# Patient Record
Sex: MALE | Race: WHITE | URBAN - METROPOLITAN AREA
[De-identification: names, ages, dates, MRNs, and addresses within clinical notes are randomized per-mention and may not be internally consistent; named-entity substitution may affect disease eponyms.]

---

## 2024-01-12 NOTE — PROGRESS NOTES
Patient: Madi GIVENS Pla, Jr.                MRN: [id number]       SSN: [id number]  YOB: 1955        AGE: 68 y.o.        SEX: male      PCP: No primary care provider on file.  01/18/24    Chief Complaint   Patient presents with    Knee Pain     right     HISTORY:  Madi GIVENS Pla, Jr. is a 68 y.o. male [facility]. He denies any recent injury.  He feels pain with standing, walking and stair climbing.  He experiences startup pain after sitting. He has received prior cortisone injections, visco supplementation series, and PT with incomplete temporary relief.     [facility] because they no longer have a knee specialist.     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 He served 10 years in the [employer]. He has a service connected disability. He lives with his wife and adult son in [place]. He has 2 adult sons, 1  adult daughter, and 2 grandchildren. His other son is a manager for [employer] and retired [employer]. He is a Metformin controlled diabetic. Per pt his last A1C was 5.4 and his glucose was 111 on 12/22/23. He weighs 244 lbs. He has recently lost some weight and is working towards losing more.   Wt Readings from Last 3 Encounters:   01/18/24 111.6 kg (246 lb)      Body mass index is 37.4 kg/m².    There is no problem list on file for this patient.      Social History     Tobacco Use    Smoking status: Unknown   Vaping Use    Vaping Use: Unknown        Not on File     No current outpatient medications on file.     Current Facility-Administered Medications   Medication Dose Route Frequency    BUPivacaine (MARCAINE) 0.5 % injection 20 mg  4 mL Intra-artICUlar Once    betamethasone acetate-betamethasone sodium phosphate (CELESTONE) injection 3 mg  3 mg

## 2024-01-18 ENCOUNTER — OFFICE VISIT (OUTPATIENT)
Age: 69
End: 2024-01-18

## 2024-01-18 VITALS — HEIGHT: 68 IN | WEIGHT: 246 LBS | TEMPERATURE: 96.2 F | BODY MASS INDEX: 37.28 KG/M2

## 2024-01-18 DIAGNOSIS — M11.20: Primary | ICD-10-CM

## 2024-01-18 DIAGNOSIS — M11.20 PSEUDOGOUT: ICD-10-CM

## 2024-01-18 DIAGNOSIS — M17.11 UNILATERAL PRIMARY OSTEOARTHRITIS, RIGHT KNEE: ICD-10-CM

## 2024-01-18 DIAGNOSIS — G89.29 CHRONIC PAIN OF RIGHT KNEE: ICD-10-CM

## 2024-01-18 DIAGNOSIS — M25.561 CHRONIC PAIN OF RIGHT KNEE: ICD-10-CM

## 2024-01-18 RX ORDER — BETAMETHASONE SODIUM PHOSPHATE AND BETAMETHASONE ACETATE 3; 3 MG/ML; MG/ML
3 INJECTION, SUSPENSION INTRA-ARTICULAR; INTRALESIONAL; INTRAMUSCULAR; SOFT TISSUE ONCE
Status: COMPLETED | OUTPATIENT
Start: 2024-01-18 | End: 2024-01-18

## 2024-01-18 RX ORDER — BUPIVACAINE HYDROCHLORIDE 5 MG/ML
4 INJECTION, SOLUTION PERINEURAL ONCE
Status: COMPLETED | OUTPATIENT
Start: 2024-01-18 | End: 2024-01-18

## 2024-01-18 RX ADMIN — BETAMETHASONE SODIUM PHOSPHATE AND BETAMETHASONE ACETATE 3 MG: 3; 3 INJECTION, SUSPENSION INTRA-ARTICULAR; INTRALESIONAL; INTRAMUSCULAR; SOFT TISSUE at 09:09

## 2024-01-18 RX ADMIN — BUPIVACAINE HYDROCHLORIDE 20 MG: 5 INJECTION, SOLUTION PERINEURAL at 09:09

## 2024-02-14 NOTE — PROGRESS NOTES
Patient: Madi GIVENS Pla, Jr.                MRN: [id number]       SSN: [id number]  YOB: 1955        AGE: 68 y.o.        SEX: male      PCP: No primary care provider on file.  02/15/24    Chief Complaint   Patient presents with    Injections     Rt knee synvisc # 1     HISTORY:  Madi GIVENS Pla, Jr. is a 68 y.o. male who is seen for right knee pain. He presents today for his first injection in the Synvisc visco supplementation series.    He was previously seen for right knee pain. He denies any recent injury.  He feels pain with standing, walking and stair climbing.  He experiences startup pain after sitting. He has received prior cortisone injections, visco supplementation series, and PT with incomplete temporary relief.      He underwent [facility] no [facility] have a knee specialist.     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 He served 10 years in the [employer]. He has a service connected disability. He lives with his wife and adult son in [place]. He has 2 adult sons, 1  adult daughter, and 2 grandchildren. His other son is a manager for [employer] and retired [employer]. He is a Metformin controlled diabetic. Per pt his last A1C was 5.4 and his glucose was 111 on 12/22/23. He weighs 244 lbs. He has recently lost some weight and is working towards losing more.    Wt Readings from Last 3 Encounters:   01/18/24 111.6 kg (246 lb)      There is no height or weight on file to calculate BMI.    There is no problem list on file for this patient.      Social History     Tobacco Use    Smoking status: Unknown   Vaping Use    Vaping Use: Unknown        Not on File     No current outpatient medications on file.     No current facility-administered medications for this visit.        PHYSICAL EXAMINATION:  There were no vitals

## 2024-02-15 ENCOUNTER — OFFICE VISIT (OUTPATIENT)
Age: 69
End: 2024-02-15
Payer: OTHER GOVERNMENT

## 2024-02-15 DIAGNOSIS — M17.11 UNILATERAL PRIMARY OSTEOARTHRITIS, RIGHT KNEE: Primary | ICD-10-CM

## 2024-02-15 PROCEDURE — 20610 DRAIN/INJ JOINT/BURSA W/O US: CPT | Performed by: SPECIALIST

## 2024-02-19 NOTE — PROGRESS NOTES
Patient: Madi GIVENS Pla, Jr.                MRN: [id number]       SSN: [id number]  YOB: 1955        AGE: 68 y.o.        SEX: male  Body mass index is 37.4 kg/m².    PCP: No primary care provider on file.  02/22/24    Chief Complaint   Patient presents with    Knee Pain     right     HISTORY:  Madi GIVENS Pla, Jr. is a 68 y.o. male who is seen for right knee pain. He presents today for his second injection in the Synvisc visco supplementation series.      ICD-10-CM    1. Unilateral primary osteoarthritis, right knee  M17.11 DRAIN/INJECT LARGE JOINT/BURSA     Hylan G-F 20 (HYLAN) injection 16 mg         PROCEDURE:  Mr. Alfaro's right knee injected with 2 cc of Synvisc.     Chart reviewed for the following:   Brian LEO MD, have reviewed the History, Physical and updated the Allergic reactions for Madi GIVENS Pla, Jr.     TIME OUT performed immediately prior to start of procedure:  Brian LEO MD, have performed the following reviews on Madi GIVENS Pla, Jr. prior to the start of the procedure:            * Patient was identified by name and date of birth   * Agreement on procedure being performed was verified  * Risks and Benefits explained to the patient  * Procedure site verified and marked as necessary  * Patient was positioned for comfort  * Consent was obtained     Time: 11:43 AM     Date of procedure: 2/22/2024    Procedure performed by:  Brian Kenney MD    Mr. Alfaro tolerated the procedure well with no complications.    PLAN:  Mr. Alfaro's right knee injected with 2 cc of Synvisc. Mr. Alfaro will follow up in one week to complete his visco supplementation injection series.    Documentation by arvind Tovar, as documented by Brian Kenney MD.

## 2024-02-22 ENCOUNTER — OFFICE VISIT (OUTPATIENT)
Age: 69
End: 2024-02-22
Payer: OTHER GOVERNMENT

## 2024-02-22 VITALS — BODY MASS INDEX: 37.4 KG/M2 | HEIGHT: 68 IN

## 2024-02-22 DIAGNOSIS — M17.11 UNILATERAL PRIMARY OSTEOARTHRITIS, RIGHT KNEE: Primary | ICD-10-CM

## 2024-02-22 PROCEDURE — 20610 DRAIN/INJ JOINT/BURSA W/O US: CPT | Performed by: SPECIALIST

## 2024-02-26 NOTE — PROGRESS NOTES
Patient: Madi GIVENS Pla, Jr.                MRN: [id number]       SSN: [id number]  YOB: 1955        AGE: 68 y.o.        SEX: male  Body mass index is 37.4 kg/m².    PCP: None, None  02/29/24    Chief Complaint   Patient presents with    Knee Pain     right     HISTORY:  Madi GIVENS Pla, Jr. is a 68 y.o. male who is seen for right knee pain. He presents today for his third injection in the Synvisc visco supplementation series.    PROCEDURE:  Mr. Alfaro's right knee injected with 2 cc of Synvisc.     TIME OUT performed immediately prior to start of procedure:  IBrian MD, have performed the following reviews on Madi GIVENS Pla, Jr. prior to the start of the procedure:            * Patient was identified by name and date of birth   * Agreement on procedure being performed was verified  * Risks and Benefits explained to the patient  * Procedure site verified and marked as necessary  * Patient was positioned for comfort  * Consent was obtained     Time: 11:20 AM     Date of procedure: 2/29/2024    Procedure performed by:  Brian Kenney MD    Mr. Alfaro tolerated the procedure well with no complications      ICD-10-CM    1. Unilateral primary osteoarthritis, right knee  M17.11 DRAIN/INJECT LARGE JOINT/BURSA     Hylan G-F 20 (HYLAN) injection 16 mg        PLAN:  Mr. Alfaro's right knee injected with 2 cc of Synvisc. Mr. Alfaro will follow up PRN now that he has completed his visco supplementation injection series.     Documentation by arvind Tovar, as documented by Brian Kenney MD.

## 2024-02-29 ENCOUNTER — OFFICE VISIT (OUTPATIENT)
Age: 69
End: 2024-02-29
Payer: OTHER GOVERNMENT

## 2024-02-29 VITALS — HEIGHT: 68 IN | WEIGHT: 246 LBS | TEMPERATURE: 97.3 F | BODY MASS INDEX: 37.28 KG/M2

## 2024-02-29 DIAGNOSIS — M17.11 UNILATERAL PRIMARY OSTEOARTHRITIS, RIGHT KNEE: Primary | ICD-10-CM

## 2024-02-29 PROCEDURE — 20610 DRAIN/INJ JOINT/BURSA W/O US: CPT | Performed by: SPECIALIST

## 2024-07-15 ENCOUNTER — TELEPHONE (OUTPATIENT)
Facility: HOSPITAL | Age: 69
End: 2024-07-15

## 2024-07-16 ENCOUNTER — HOSPITAL ENCOUNTER (OUTPATIENT)
Facility: HOSPITAL | Age: 69
Setting detail: RECURRING SERIES
Discharge: HOME | End: 2024-07-19
Payer: OTHER GOVERNMENT

## 2024-07-16 PROCEDURE — 97161 PT EVAL LOW COMPLEX 20 MIN: CPT

## 2024-07-16 NOTE — THERAPY EVALUATION
[facility]  [address], [address], [place] [address] Ph:[phone] Fx: [phone]  Plan of Care / Statement of Necessity for Physical Therapy Services     Patient Name: Madi GIVENS Pla, Jr. : 1955   Medical   Diagnosis: Right knee pain [M25.561] Treatment Diagnosis:  M25.561  RIGHT KNEE PAIN     Onset Date: /Chronic     Referral Source: Kinza Suero PA Start of Care (SOC): 2024   Prior Hospitalization: See medical history Provider #: [id number]   Prior Level of Function: Ind and less pain with ADLs and activities of leisure   Comorbidities: Diabetes mellitus and Musculoskeletal disorders      Assessment / key information:    Patient is a 69 y.o. year old male. Primary complaints of Right knee pain [M25.561] that began in  due to  service. Locates pain at anterior knee. Rates pain as 5-10/10 from best to worst. Pain today is rated as 6/10. Describes pain as throbbing, nagging and constant in nature. Endorses occurences of \"locking up\". Elevation, Volatren Cream, Lidocaine Patch makes the pain better. Standing for 6 hours or more, kneeling/crouching makes the pain worse. 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 Patient enjoys taking part in reading, yard work, working on cars, and playing baseball with grandkids. Their primary goals in PT are to \"reduce pain and reduce instances of locking up\" in order to take part in these activities of leisure. Patient recently saw Kinza Suero PA who prescribed OPPT. They are scheduled to return to their provider on 24 with Orthopedic doctor at [facility].    Not post operative    Prior Diagnostic Tests: [] None      [] Lab work      [x] X-rays      [] CT      [] MRI      [] Other:  Results: bone on bone, minimal cartilage remaining.     Medical History:

## 2024-07-16 NOTE — PROGRESS NOTES
PHYSICAL / OCCUPATIONAL THERAPY - DAILY TREATMENT NOTE (updated )  For Eval visit    Patient Name: Madi GIVENS Pla, Jr.    Date: 2024    : 1955  Insurance: Payor: VACCN OPTUM / Plan: VAC[employer] OPTUM / Product Type: *No Product type* /      Patient  verified yes     Visit #   Current / Total 1 15   Time   In / Out 8:20 am  9:00 am   Pain   In / Out 6 6   Subjective Functional Status/Changes: See POC     TREATMENT AREA =  Right knee pain [M25.561]    OBJECTIVE    30 min   Eval - untimed                      Therapeutic Procedures:  Tx Min Billable or 1:1 Min (if diff from Tx Min) Procedure, Rationale, Specifics   5  06325 Therapeutic Activity (timed):  use of dynamic activities replicating functional movements to increase ROM, strength, coordination, balance, and proprioception in order to improve patient's ability to progress to PLOF and address remaining functional goals.  (see flow sheet as applicable)     Details if applicable:     5  36364 Self Care/Home Management (timed):  improve patient knowledge and understanding of pain reducing techniques, positioning, posture/ergonomics, home safety, activity modification, diagnosis/prognosis, and physical therapy expectations, procedures and progression  to improve patient's ability to progress to PLOF and address remaining functional goals.  (see flow sheet as applicable)     Details if applicable:     10  [facility] Totals Reminder: bill using total billable min of TIMED therapeutic procedures (example: do not include dry needle or estim unattended, both untimed codes, in totals to left)  8-22 min = 1 unit; 23-37 min = 2 units; 38-52 min = 3 units; 53-67 min = 4 units; 68-82 min = 5 units   Total Total     [x]  Patient Education billed concurrently with other procedures   [x] Review HEP    [] Progressed/Changed HEP, detail:    [] Other detail:       Objective Information/Functional Measures/Assessment    See POC.    Patient will continue to benefit from skilled PT

## 2024-07-24 ENCOUNTER — HOSPITAL ENCOUNTER (OUTPATIENT)
Facility: HOSPITAL | Age: 69
Setting detail: RECURRING SERIES
Discharge: HOME | End: 2024-07-27
Payer: OTHER GOVERNMENT

## 2024-07-24 PROCEDURE — 97110 THERAPEUTIC EXERCISES: CPT

## 2024-07-24 PROCEDURE — 97112 NEUROMUSCULAR REEDUCATION: CPT

## 2024-07-24 PROCEDURE — 97530 THERAPEUTIC ACTIVITIES: CPT

## 2024-07-24 NOTE — PROGRESS NOTES
PHYSICAL / OCCUPATIONAL THERAPY - DAILY TREATMENT NOTE    Patient Name: Madi GIVENS Pla, Jr.    Date: 2024    :   Insurance: Payor: V[employer] OPTUM / Plan: V[employer] OPTUM / Product Type: *No Product type* /      Patient  verified Yes     Visit #   Current / Total 2 15   Time   In / Out 819  AM   Pain   In / Out 5/10 0/10   Subjective Functional Status/Changes: Pt arrives today w/ R knee x-rays from  and . Pt reports having 10/10 pain in the mornings and sitting to standing. Pain went down after sitting and resting.   Plans on bringing his cane during his vacation since he'll be out and about and wants to make sure he's stable.   Patient denies falls or red flags since last visit.     TREATMENT AREA =  Right knee pain [M25.561]       OBJECTIVE    Modalities Rationale:     decrease pain to improve patient's ability to progress to PLOF and address remaining functional goals.     min [] Estim Unattended, type/location:                                      []  w/ice    []  w/heat    min [] Estim Attended, type/location:                                     []  w/US     []  w/ice    []  w/heat    []  TENS insruct      min []  Mechanical Traction: type/lbs                   []  pro   []  sup   []  int   []  cont    []  before manual    []  after manual    min []  Ultrasound, settings/location:     10 min  unbill [x]  Ice     []  Heat    location/position: SEMI-RECLINE to B knees post-session    min []  Paraffin,  details:     min []  Vasopneumatic Device, press/temp:     min []  Whirlpool / Fluido:    If using vaso (only need to measure limb vaso being performed on)      pre-treatment girth :       post-treatment girth :       measured at (landmark location) :      min []  Other:    Skin assessment post-treatment:   Intact      Therapeutic Procedures:  Tx Min Billable or 1:1 Min (if diff from Tx Min) Procedure, Rationale, Specifics   Total  64 Total   [facility] Totals Reminder: bill using total billable

## 2024-07-25 NOTE — PROGRESS NOTES
Patient: Madi GIVENS Pla, Jr.                MRN: [id number]       SSN: [id number]  YOB: 1955        AGE: 69 y.o.        SEX: male      PCP: None, None  07/29/24    Chief Complaint   Patient presents with    Knee Pain     Right knee     HISTORY:  Madi GIVENS Pla, Jr. is a 69 y.o. male who is seen for increased right knee pain. Patient successfully completed a right knee Synvisc series on 2/29/24 but his pains have returned. He denies any recent injury.  He feels pain with standing, walking and stair climbing.  He experiences startup pain after sitting.     He was seen at [facility] yesterday for pain that starts in his right lower back and radiates down his right leg. [facility].  He was told that he needed orthopedic clearance to start physical therapy.     [facility] because they no longer have a knee specialist.      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 He served 10 years in the [employer]. He has a service connected disability. He lives with his wife and adult son in [place]. He has 2 adult sons, 1  adult daughter, and 2 grandchildren. His other son is a manager for [employer] and retired [employer]. He is a Metformin controlled diabetic. Per pt his last A1C was 5.4 and his glucose was 111 on 12/22/23. He weighs 239 lbs. He has recently lost 30 lbs while taking Monjaro.   Wt Readings from Last 3 Encounters:   07/29/24 108.4 kg (239 lb)   02/29/24 111.6 kg (246 lb)   01/18/24 111.6 kg (246 lb)      Body mass index is 36.34 kg/m².    There is no problem list on file for this patient.      Social History     Tobacco Use    Smoking status: Unknown   Vaping Use    Vaping Use: Unknown        Allergies   Allergen Reactions    Penicillins      Other Reaction(s): rash/itching

## 2024-07-26 ENCOUNTER — HOSPITAL ENCOUNTER (OUTPATIENT)
Facility: HOSPITAL | Age: 69
Setting detail: RECURRING SERIES
Discharge: HOME | End: 2024-07-29
Payer: OTHER GOVERNMENT

## 2024-07-26 PROCEDURE — 97110 THERAPEUTIC EXERCISES: CPT

## 2024-07-26 PROCEDURE — 97140 MANUAL THERAPY 1/> REGIONS: CPT

## 2024-07-26 PROCEDURE — 97530 THERAPEUTIC ACTIVITIES: CPT

## 2024-07-26 NOTE — PROGRESS NOTES
PHYSICAL / OCCUPATIONAL THERAPY - DAILY TREATMENT NOTE    Patient Name: Madi GIVENS Pla, Jr.    Date: 2024    :   Insurance: Payor: V[employer] OPTUM / Plan: VACCN OPTUM / Product Type: *No Product type* /      Patient  verified Yes     Visit #   Current / Total 3 15   Time   In / Out 7:00 AM 7:40 AM   Pain   In / Out 5/10 2/10   Subjective Functional Status/Changes: Pt presents to therapy with bilateral knee compression sleeves. Reports general R knee pain and tightness throughout the R LE.     TREATMENT AREA =  Right knee pain [M25.561]       OBJECTIVE    Modalities Rationale:     decrease pain to improve patient's ability to progress to PLOF and address remaining functional goals.     min [] Estim Unattended, type/location:                                      []  w/ice    []  w/heat    min [] Estim Attended, type/location:                                     []  w/US     []  w/ice    []  w/heat    []  TENS insruct      min []  Mechanical Traction: type/lbs                   []  pro   []  sup   []  int   []  cont    []  before manual    []  after manual    min []  Ultrasound, settings/location:      min  unbill []  Ice     []  Heat    location/position: SEMI-RECLINE to B knees post-session    min []  Paraffin,  details:     min []  Vasopneumatic Device, press/temp:     min []  Whirlpool / Fluido:    If using vaso (only need to measure limb vaso being performed on)      pre-treatment girth :       post-treatment girth :       measured at (landmark location) :      min []  Other:    Skin assessment post-treatment:   Intact      Therapeutic Procedures:  Tx Min Billable or 1:1 Min (if diff from Tx Min) Procedure, Rationale, Specifics   Total  40 Total   [facility] Totals Reminder: bill using total billable min of TIMED therapeutic procedures (example: do not include dry needle or estim unattended, both untimed codes, in totals to left)  8-22 min = 1 unit; 23-37 min = 2 units; 38-52 min = 3 units; 53-67 min = 4

## 2024-07-29 ENCOUNTER — OFFICE VISIT (OUTPATIENT)
Age: 69
End: 2024-07-29
Payer: OTHER GOVERNMENT

## 2024-07-29 VITALS — WEIGHT: 239 LBS | HEIGHT: 68 IN | TEMPERATURE: 97 F | BODY MASS INDEX: 36.22 KG/M2

## 2024-07-29 DIAGNOSIS — G89.29 CHRONIC PAIN OF RIGHT KNEE: ICD-10-CM

## 2024-07-29 DIAGNOSIS — M11.20 PSEUDOGOUT: ICD-10-CM

## 2024-07-29 DIAGNOSIS — M17.11 UNILATERAL PRIMARY OSTEOARTHRITIS, RIGHT KNEE: Primary | ICD-10-CM

## 2024-07-29 DIAGNOSIS — M25.561 CHRONIC PAIN OF RIGHT KNEE: ICD-10-CM

## 2024-07-29 PROCEDURE — 1123F ACP DISCUSS/DSCN MKR DOCD: CPT | Performed by: SPECIALIST

## 2024-07-29 PROCEDURE — 20610 DRAIN/INJ JOINT/BURSA W/O US: CPT | Performed by: SPECIALIST

## 2024-07-29 PROCEDURE — 99213 OFFICE O/P EST LOW 20 MIN: CPT | Performed by: SPECIALIST

## 2024-07-29 RX ORDER — BETAMETHASONE SODIUM PHOSPHATE AND BETAMETHASONE ACETATE 3; 3 MG/ML; MG/ML
3 INJECTION, SUSPENSION INTRA-ARTICULAR; INTRALESIONAL; INTRAMUSCULAR; SOFT TISSUE ONCE
Status: COMPLETED | OUTPATIENT
Start: 2024-07-29 | End: 2024-07-29

## 2024-07-29 RX ORDER — BUPIVACAINE HYDROCHLORIDE 5 MG/ML
4 INJECTION, SOLUTION PERINEURAL ONCE
Status: COMPLETED | OUTPATIENT
Start: 2024-07-29 | End: 2024-07-29

## 2024-07-29 RX ADMIN — BUPIVACAINE HYDROCHLORIDE 20 MG: 5 INJECTION, SOLUTION PERINEURAL at 11:58

## 2024-07-29 RX ADMIN — BETAMETHASONE SODIUM PHOSPHATE AND BETAMETHASONE ACETATE 3 MG: 3; 3 INJECTION, SUSPENSION INTRA-ARTICULAR; INTRALESIONAL; INTRAMUSCULAR; SOFT TISSUE at 11:58

## 2024-07-31 ENCOUNTER — HOSPITAL ENCOUNTER (OUTPATIENT)
Facility: HOSPITAL | Age: 69
Setting detail: RECURRING SERIES
Discharge: HOME | End: 2024-08-03
Payer: OTHER GOVERNMENT

## 2024-07-31 PROCEDURE — 97530 THERAPEUTIC ACTIVITIES: CPT

## 2024-07-31 PROCEDURE — 97140 MANUAL THERAPY 1/> REGIONS: CPT

## 2024-07-31 PROCEDURE — 97112 NEUROMUSCULAR REEDUCATION: CPT

## 2024-07-31 NOTE — PROGRESS NOTES
PHYSICAL / OCCUPATIONAL THERAPY - DAILY TREATMENT NOTE    Patient Name: Madi GIVENS Pla, Jr.    Date: 2024    :   Insurance: Payor: VACCN OPTUM / Plan: VACCN OPTUM / Product Type: *No Product type* /      Patient  verified Yes     Visit #   Current / Total 4 15   Time   In / Out 7:10 AM 7:40 AM   Pain   In / Out 2/10 2/10   Subjective Functional Status/Changes: Got an injection in knee and only helped for 15 minutes. Cortisone.   He also got sciatica and has right back to shin pains right now. They gave him steroids and mm relaxers.      TREATMENT AREA =  Right knee pain [M25.561]     OBJECTIVE    Modalities Rationale:     decrease pain to improve patient's ability to progress to PLOF and address remaining functional goals.     min [] Estim Unattended, type/location:                                      []  w/ice    []  w/heat    min [] Estim Attended, type/location:                                     []  w/US     []  w/ice    []  w/heat    []  TENS insruct      min []  Mechanical Traction: type/lbs                   []  pro   []  sup   []  int   []  cont    []  before manual    []  after manual    min []  Ultrasound, settings/location:      min  unbill []  Ice     []  Heat    location/position: SEMI-RECLINE to B knees post-session    min []  Paraffin,  details:     min []  Vasopneumatic Device, press/temp:     min []  Whirlpool / Fluido:    If using vaso (only need to measure limb vaso being performed on)      pre-treatment girth :       post-treatment girth :       measured at (landmark location) :      min []  Other:    Skin assessment post-treatment:   Intact      Therapeutic Procedures:  Tx Min Billable or 1:1 Min (if diff from Tx Min) Procedure, Rationale, Specifics   Total  30 Total   [facility] Totals Reminder: bill using total billable min of TIMED therapeutic procedures (example: do not include dry needle or estim unattended, both untimed codes, in totals to left)  8-22 min = 1 unit; 23-37 min =

## 2024-08-02 ENCOUNTER — HOSPITAL ENCOUNTER (OUTPATIENT)
Facility: HOSPITAL | Age: 69
Setting detail: RECURRING SERIES
Discharge: HOME | End: 2024-08-05
Payer: OTHER GOVERNMENT

## 2024-08-02 PROCEDURE — 97530 THERAPEUTIC ACTIVITIES: CPT

## 2024-08-02 PROCEDURE — 97535 SELF CARE MNGMENT TRAINING: CPT

## 2024-08-02 PROCEDURE — 97112 NEUROMUSCULAR REEDUCATION: CPT

## 2024-08-02 PROCEDURE — 97140 MANUAL THERAPY 1/> REGIONS: CPT

## 2024-08-02 NOTE — PROGRESS NOTES
travelling.    55  [facility] Totals Reminder: bill using total billable min of TIMED therapeutic procedures (example: do not include dry needle or estim unattended, both untimed codes, in totals to left)  8-22 min = 1 unit; 23-37 min = 2 units; 38-52 min = 3 units; 53-67 min = 4 units; 68-82 min = 5 units   Total Total     [x]  Patient Education billed concurrently with other procedures   [x] Review HEP    [] Progressed/Changed HEP, detail:    [] Other detail:       Objective Information/Functional Measures/Assessment    Verbal or Tactile cues required: for all stretching initiated this session for form, sets/reps/hold times, and for adjustments needed for angle of pull during stretches   Posture/positioning: good and upright; minimal limping noted on R LE due to limited TKE   ROM: unchanged   Palpation: B hamstring mobility poor with R>L restriction     Therex and NMR as per flow sheet.     Patient will continue to benefit from skilled PT / OT services to modify and progress therapeutic interventions, analyze and address functional mobility deficits, analyze and address ROM deficits, analyze and address strength deficits, analyze and address soft tissue restrictions, analyze and cue for proper movement patterns, and analyze and modify for postural abnormalities to address functional deficits and attain remaining goals.    Progress toward goals / Updated goals:  []  See Progress Note/Recertification    Patient tolerated today's treatment well. Progressed/added therex as follows: seated hamstring and piriformis stretching, and standing stair HF and hamstring stretching. Discussed stretching routine daily and while travelling with patient. Patient is agreeable. Continue to progress as tolerated. Patient making good progress towards all goals at this time. Pain levels masked due to concurrent sciatica experienced on R side, ROM slow to improve due to this. Strength slowly improving. All goals remain applicable.

## 2024-08-13 ENCOUNTER — HOSPITAL ENCOUNTER (OUTPATIENT)
Facility: HOSPITAL | Age: 69
Setting detail: RECURRING SERIES
Discharge: HOME | End: 2024-08-16
Payer: OTHER GOVERNMENT

## 2024-08-13 PROCEDURE — 97530 THERAPEUTIC ACTIVITIES: CPT

## 2024-08-13 PROCEDURE — 97110 THERAPEUTIC EXERCISES: CPT

## 2024-08-13 PROCEDURE — 97112 NEUROMUSCULAR REEDUCATION: CPT

## 2024-08-13 NOTE — PROGRESS NOTES
PHYSICAL / OCCUPATIONAL THERAPY - DAILY TREATMENT NOTE (updated )    Patient Name: Madi GIVENS Pla, Jr.    Date: 2024    : 1955  Insurance: Payor: VACCN OPTUM / Plan: VACCN OPTUM / Product Type: *No Product type* /      Patient  verified yes     Visit #   Current / Total 6 15   Time   In / Out 7:00 am 7:40 am   Pain   In / Out 7 6   Subjective Functional Status/Changes: Patient reports feeling very achy and stiff this morning. Notes he just took his meloxicam not too long ago so the effects are not working quite yet.        TREATMENT AREA =  Right knee pain [M25.561]    OBJECTIVE  Therapeutic Procedures:  Tx Min Billable or 1:1 Min (if diff from Tx Min) Procedure, Rationale, Specifics   15  76397 Therapeutic Exercise (timed):  increase ROM, strength, coordination, balance, and proprioception to improve patient's ability to progress to PLOF and address remaining functional goals. (see flow sheet as applicable)     Details if applicable:       15  25629 Neuromuscular Re-Education (timed):  improve balance, coordination, kinesthetic sense, posture, core stability and proprioception to improve patient's ability to develop conscious control of individual muscles and awareness of position of extremities in order to progress to PLOF and address remaining functional goals. (see flow sheet as applicable)     Details if applicable:     10  13398 Therapeutic Activity (timed):  use of dynamic activities replicating functional movements to increase ROM, strength, coordination, balance, and proprioception in order to improve patient's ability to progress to PLOF and address remaining functional goals.  (see flow sheet as applicable)     Details if applicable:     40  [facility] Totals Reminder: bill using total billable min of TIMED therapeutic procedures (example: do not include dry needle or estim unattended, both untimed codes, in totals to left)  8-22 min = 1 unit; 23-37 min = 2 units; 38-52 min = 3 units; 53-67 min

## 2024-08-15 ENCOUNTER — HOSPITAL ENCOUNTER (OUTPATIENT)
Facility: HOSPITAL | Age: 69
Setting detail: RECURRING SERIES
Discharge: HOME | End: 2024-08-18
Payer: OTHER GOVERNMENT

## 2024-08-15 ENCOUNTER — APPOINTMENT (OUTPATIENT)
Facility: HOSPITAL | Age: 69
End: 2024-08-15
Payer: OTHER GOVERNMENT

## 2024-08-15 PROCEDURE — 97110 THERAPEUTIC EXERCISES: CPT

## 2024-08-15 PROCEDURE — 97112 NEUROMUSCULAR REEDUCATION: CPT

## 2024-08-15 PROCEDURE — 97530 THERAPEUTIC ACTIVITIES: CPT

## 2024-08-15 NOTE — THERAPY RECERTIFICATION
[facility]  [address] [facility] [address], [place] [address] - Ph: [phone]  Fx: [phone]  PHYSICAL THERAPY PROGRESS NOTE  Patient Name: Madi GIVENS Pla, Jr. : 1955   Treatment/Medical Diagnosis: Right knee pain [M25.561]   Referral Source: Kinza Suero PA     Date of Initial Visit: 24 Attended Visits: 7 Missed Visits: 0     SUMMARY OF TREATMENT  Pt has been evaluated/assessed and participated in 7 PT sessions involving skilled therapeutic exercise, functional activity training,?neuromuscular facilitation and coordination training, patient education,?manual therapy interventions including STM and stretching, modalities including ice, and instruction on HEP in order to improve upon R knee ROM, strength, decreased pain, and return to PLOF.    SUBJECTIVE: the back is doing well but the knee is mostly the same.    CURRENT STATUS  Pt has made mild progress in PT. Max pain level at 10/10 , average pain level at 7-8/10, least pain level at 2/10. Pt report 20% improvement since beginning therapy. Feels like he can walk a little bit more. Pt notes the knee is about the same. Talked to the doctor and the plan is to possibly get another injection for the R knee. [facility] currently. Skeptical about this providing him relief. Most recent injection was on 24, and provided mild to no relief. Pt expresses desire for surgical intervention since he has tried PT without much relief and multiple trials of injections with the most recent not providing significant relief.   Does note having had some relief for his sciatica on his R side. Was bothering him a lot 2 weeks ago and today notes no R side sciatica sxs. Pt notes having been performing the HEP as prescribed. Pt will probably benefit from PT for the remaining authorized visits addressing his remaining mobility and strength impairments, and then proceeding

## 2024-08-15 NOTE — PROGRESS NOTES
Review HEP    [] Progressed/Changed HEP, detail:    [] Other detail:       Objective Information/Functional Measures/Assessment    See PN    Patient will continue to benefit from skilled PT / OT services to modify and progress therapeutic interventions, analyze and address functional mobility deficits, analyze and address ROM deficits, analyze and address strength deficits, analyze and address soft tissue restrictions, analyze and cue for proper movement patterns, analyze and modify for postural abnormalities, analyze and address imbalance/dizziness, and instruct in home and community integration to address functional deficits and attain remaining goals.    Progress toward goals / Updated goals:  [x]  See Progress Note/Recertification    PLAN  yes Continue plan of care  [x]  Upgrade activities as tolerated  []  Discharge due to :  []  Other:    Next PN/ RC due on or before 9/15/24  Auth due (visit number/ date) 15 V exp 12/23/24    Abran Cody, PT    8/15/2024    1:29 PM    If an interpreting service was utilized for treatment of this patient, the contents of this document represent the material reviewed with the patient via the .     Future Appointments   Date Time Provider Department Center   8/15/2024  1:40 PM Abran Cody, PT [facility]   8/22/2024  7:00 AM Angelica Mclaughlin, PT [facility]   8/27/2024  7:00 AM Angelica Mclaughlin, PT [facility]   8/29/2024  7:00 AM Angelica Mclaughlin, PT [facility]

## 2024-08-22 ENCOUNTER — HOSPITAL ENCOUNTER (OUTPATIENT)
Facility: HOSPITAL | Age: 69
Setting detail: RECURRING SERIES
Discharge: HOME | End: 2024-08-25
Payer: OTHER GOVERNMENT

## 2024-08-22 PROCEDURE — 97530 THERAPEUTIC ACTIVITIES: CPT

## 2024-08-22 PROCEDURE — 97112 NEUROMUSCULAR REEDUCATION: CPT

## 2024-08-22 PROCEDURE — 97110 THERAPEUTIC EXERCISES: CPT

## 2024-08-22 NOTE — PROGRESS NOTES
PHYSICAL / OCCUPATIONAL THERAPY - DAILY TREATMENT NOTE (updated )    Patient Name: Madi GIVENS Pla, Jr.    Date: 2024    : 1955  Insurance: Payor: [employer] / Plan: [employer] OPTUM / Product Type: *No Product type* /      Patient  verified yes     Visit #   Current / Total 8 15   Time   In / Out 7:00 AM 7:50 AM   Pain   In / Out 6 0   Subjective Functional Status/Changes: Patient reports his knee is really bothering him this morning. Notes he is ready to get the knee surgery.         TREATMENT AREA =  Right knee pain [M25.561]    OBJECTIVE    Modalities Rationale:     decrease inflammation and decrease pain to improve patient's ability to progress to PLOF and address remaining functional goals.     min [] Estim Unattended, type/location:                                      []  w/ice    []  w/heat    min [] Estim Attended, type/location:                                     []  w/US     []  w/ice    []  w/heat    []  TENS insruct      min []  Mechanical Traction: type/lbs                   []  pro   []  sup   []  int   []  cont    []  before manual    []  after manual    min []  Ultrasound, settings/location:                                                []  take home patch       []  in clinic   10 min  unbilled [x]  Ice     []  Heat    location/position: Right knee; ant/post; longsitting    min []  Paraffin,  details:     min []  Vasopneumatic Device, press/temp:     min []  Whirlpool / Fluido:    If using vaso (only need to measure limb vaso being performed on)      pre-treatment girth :       post-treatment girth :       measured at (landmark location) :      min []  Other:    Skin assessment post-treatment (if applicable):    [x]  intact    []  redness- no adverse reaction                 []redness - adverse reaction:        Therapeutic Procedures:  Tx Min Billable or 1:1 Min (if diff from Tx Min) Procedure, Rationale, Specifics   15  62079 Therapeutic Exercise (timed):  increase ROM, strength,  deficits, analyze and address soft tissue restrictions, analyze and cue for proper movement patterns, and analyze and modify for postural abnormalities to address functional deficits and attain remaining goals.    Progress toward goals / Updated goals:  []  See Progress Note/Recertification    Patient tolerated today's treatment well. Progressed/added therex as follows: increased resistance on nustep. Discussed HEP and completing remaining authorized PT visits with patient. Patient is agreeable. Continue to progress as tolerated. Patient making fair progress towards all goals at this time. Pain remains the same and is easy to elevate with increased activity and PT exercises. All goals remain applicable.     PLAN  yes Continue plan of care  [x]  Upgrade activities as tolerated  []  Discharge due to :  []  Other:    Next PN/ RC due on or before 9/15/2024  Auth due (visit number/ date) 15 V exp 12/23/2024    Angelica Mclaughlin PT    8/22/2024    6:57 AM    If an interpreting service was utilized for treatment of this patient, the contents of this document represent the material reviewed with the patient via the .     Future Appointments   Date Time Provider Department Center   8/22/2024  7:00 AM Angelica Mclaughlin PT [facility]   8/27/2024  7:00 AM Angelica Mclaughlin PT [facility]   8/29/2024  7:00 AM Angelica Mclaughlin PT [facility]

## 2024-08-27 ENCOUNTER — HOSPITAL ENCOUNTER (OUTPATIENT)
Facility: HOSPITAL | Age: 69
Setting detail: RECURRING SERIES
Discharge: HOME | End: 2024-08-30
Payer: OTHER GOVERNMENT

## 2024-08-27 PROCEDURE — 97112 NEUROMUSCULAR REEDUCATION: CPT

## 2024-08-27 PROCEDURE — 97110 THERAPEUTIC EXERCISES: CPT

## 2024-08-27 PROCEDURE — 97530 THERAPEUTIC ACTIVITIES: CPT

## 2024-08-27 NOTE — PROGRESS NOTES
PHYSICAL / OCCUPATIONAL THERAPY - DAILY TREATMENT NOTE (updated )    Patient Name: Madi GIVENS Pla, Jr.    Date: 2024    : 1955  Insurance: Payor: [employer] / Plan: [employer] OPTUM / Product Type: *No Product type* /      Patient  verified yes     Visit #   Current / Total 9 15   Time   In / Out 7:00 am 7:50 am   Pain   In / Out 8 6   Subjective Functional Status/Changes: Patient reports he only woke up about an hour ago and still feels very stiff and achy.         TREATMENT AREA =  Right knee pain [M25.561]    OBJECTIVE    Modalities Rationale:     decrease inflammation and decrease pain to improve patient's ability to progress to PLOF and address remaining functional goals.     min [] Estim Unattended, type/location:                                      []  w/ice    []  w/heat    min [] Estim Attended, type/location:                                     []  w/US     []  w/ice    []  w/heat    []  TENS insruct      min []  Mechanical Traction: type/lbs                   []  pro   []  sup   []  int   []  cont    []  before manual    []  after manual    min []  Ultrasound, settings/location:                                                []  take home patch       []  in clinic   10 min  unbilled [x]  Ice     []  Heat    location/position: Right knee; ant/post; longsitting    min []  Paraffin,  details:     min []  Vasopneumatic Device, press/temp:     min []  Whirlpool / Fluido:    If using vaso (only need to measure limb vaso being performed on)      pre-treatment girth :       post-treatment girth :       measured at (landmark location) :      min []  Other:    Skin assessment post-treatment (if applicable):    [x]  intact    []  redness- no adverse reaction                 []redness - adverse reaction:        Therapeutic Procedures:  Tx Min Billable or 1:1 Min (if diff from Tx Min) Procedure, Rationale, Specifics   15  [id number] Therapeutic Exercise (timed):  increase ROM, strength, coordination, balance,  and cue for proper movement patterns, and analyze and modify for postural abnormalities to address functional deficits and attain remaining goals.    Progress toward goals / Updated goals:  []  See Progress Note/Recertification    Patient tolerated today's treatment well. Progressed/added therex as follows: no progressions; reduced therex to table exercises and stretching due to increase in pain severity. Discussed HEP and icing at home with patient. Patient is agreeable. Continue to progress as tolerated. Patient making good progress towards all goals at this time. Pain elevated; ROM remains the same, strength improving well. All goals remain applicable.     PLAN  yes Continue plan of care  [x]  Upgrade activities as tolerated  []  Discharge due to :  []  Other:    Next PN/ RC due on or before 9/15/24  Auth due (visit number/ date) 15 V exp 12/23/24    Angelica Mclaughlin PT    8/27/2024    6:50 AM    If an interpreting service was utilized for treatment of this patient, the contents of this document represent the material reviewed with the patient via the .     Future Appointments   Date Time Provider Department Center   8/27/2024  7:00 AM Angelica Mclaughlin, PT [facility]   8/29/2024  7:00 AM Angelica Mclaughlin, PT [facility]

## 2024-08-29 ENCOUNTER — HOSPITAL ENCOUNTER (OUTPATIENT)
Facility: HOSPITAL | Age: 69
Setting detail: RECURRING SERIES
End: 2024-08-29
Payer: OTHER GOVERNMENT

## 2024-08-29 PROCEDURE — 97110 THERAPEUTIC EXERCISES: CPT

## 2024-08-29 PROCEDURE — 97530 THERAPEUTIC ACTIVITIES: CPT

## 2024-08-29 PROCEDURE — 97112 NEUROMUSCULAR REEDUCATION: CPT

## 2024-08-29 NOTE — PROGRESS NOTES
PHYSICAL / OCCUPATIONAL THERAPY - DAILY TREATMENT NOTE (updated )    Patient Name: Madi GIVENS Pla, Jr.    Date: 2024    : 1955  Insurance: Payor: [employer] / Plan: [employer] OPTUM / Product Type: *No Product type* /      Patient  verified yes     Visit #   Current / Total 10 15   Time   In / Out 7:00 am 7:50 am   Pain   In / Out 7 0 (numb from ice)   Subjective Functional Status/Changes: Patient reports more pain in the mornings still. Notes he needs to schedule a follow up with his provider. Wishes to complete more PT to maintain strength and ROM prior to getting a knee replacement.         TREATMENT AREA =  Right knee pain [M25.561]    OBJECTIVE    Modalities Rationale:     decrease inflammation and decrease pain to improve patient's ability to progress to PLOF and address remaining functional goals.     min [] Estim Unattended, type/location:                                      []  w/ice    []  w/heat    min [] Estim Attended, type/location:                                     []  w/US     []  w/ice    []  w/heat    []  TENS insruct      min []  Mechanical Traction: type/lbs                   []  pro   []  sup   []  int   []  cont    []  before manual    []  after manual    min []  Ultrasound, settings/location:                                                []  take home patch       []  in clinic   10 min  unbilled [x]  Ice     []  Heat    location/position: Right knee; ant/post; longsitting    min []  Paraffin,  details:     min []  Vasopneumatic Device, press/temp:     min []  Whirlpool / Fluido:    If using vaso (only need to measure limb vaso being performed on)      pre-treatment girth :       post-treatment girth :       measured at (landmark location) :      min []  Other:    Skin assessment post-treatment (if applicable):    [x]  intact    []  redness- no adverse reaction                 []redness - adverse reaction:        Therapeutic Procedures:  Tx Min Billable or 1:1 Min (if diff from

## 2024-09-10 ENCOUNTER — HOSPITAL ENCOUNTER (OUTPATIENT)
Facility: HOSPITAL | Age: 69
Setting detail: RECURRING SERIES
Discharge: HOME | End: 2024-09-13
Payer: OTHER GOVERNMENT

## 2024-09-10 PROCEDURE — 97110 THERAPEUTIC EXERCISES: CPT

## 2024-09-10 PROCEDURE — 97112 NEUROMUSCULAR REEDUCATION: CPT

## 2024-09-10 PROCEDURE — 97530 THERAPEUTIC ACTIVITIES: CPT

## 2024-09-12 ENCOUNTER — HOSPITAL ENCOUNTER (OUTPATIENT)
Facility: HOSPITAL | Age: 69
Setting detail: RECURRING SERIES
Discharge: HOME | End: 2024-09-15
Payer: OTHER GOVERNMENT

## 2024-09-12 PROCEDURE — 97110 THERAPEUTIC EXERCISES: CPT

## 2024-09-12 PROCEDURE — 97530 THERAPEUTIC ACTIVITIES: CPT

## 2024-09-12 PROCEDURE — 97112 NEUROMUSCULAR REEDUCATION: CPT

## 2024-09-13 ENCOUNTER — OFFICE VISIT (OUTPATIENT)
Age: 69
End: 2024-09-13

## 2024-09-13 VITALS — WEIGHT: 230 LBS | BODY MASS INDEX: 34.86 KG/M2 | HEIGHT: 68 IN

## 2024-09-13 DIAGNOSIS — G89.29 CHRONIC PAIN OF RIGHT KNEE: ICD-10-CM

## 2024-09-13 DIAGNOSIS — M25.561 CHRONIC PAIN OF RIGHT KNEE: ICD-10-CM

## 2024-09-13 DIAGNOSIS — M17.11 UNILATERAL PRIMARY OSTEOARTHRITIS, RIGHT KNEE: Primary | ICD-10-CM

## 2024-09-13 RX ORDER — HYALURONATE SODIUM 10 MG/ML
20 SYRINGE (ML) INTRAARTICULAR ONCE
Status: COMPLETED | OUTPATIENT
Start: 2024-09-13 | End: 2024-09-13

## 2024-09-13 RX ADMIN — Medication 20 MG: at 16:53

## 2024-09-17 ENCOUNTER — APPOINTMENT (OUTPATIENT)
Facility: HOSPITAL | Age: 69
End: 2024-09-17
Payer: OTHER GOVERNMENT

## 2024-09-19 ENCOUNTER — HOSPITAL ENCOUNTER (OUTPATIENT)
Facility: HOSPITAL | Age: 69
Setting detail: RECURRING SERIES
End: 2024-09-19
Payer: OTHER GOVERNMENT

## 2024-09-20 ENCOUNTER — OFFICE VISIT (OUTPATIENT)
Age: 69
End: 2024-09-20

## 2024-09-20 ENCOUNTER — APPOINTMENT (OUTPATIENT)
Facility: HOSPITAL | Age: 69
End: 2024-09-20
Payer: OTHER GOVERNMENT

## 2024-09-20 DIAGNOSIS — M17.11 UNILATERAL PRIMARY OSTEOARTHRITIS, RIGHT KNEE: Primary | ICD-10-CM

## 2024-09-20 RX ORDER — HYALURONATE SODIUM 10 MG/ML
20 SYRINGE (ML) INTRAARTICULAR ONCE
Status: COMPLETED | OUTPATIENT
Start: 2024-09-20 | End: 2024-09-20

## 2024-09-20 RX ADMIN — Medication 20 MG: at 16:00

## 2024-09-26 ENCOUNTER — APPOINTMENT (OUTPATIENT)
Facility: HOSPITAL | Age: 69
End: 2024-09-26
Payer: OTHER GOVERNMENT

## 2024-09-27 ENCOUNTER — HOSPITAL ENCOUNTER (OUTPATIENT)
Facility: HOSPITAL | Age: 69
Setting detail: RECURRING SERIES
End: 2024-09-27
Payer: OTHER GOVERNMENT

## 2024-09-27 ENCOUNTER — TELEPHONE (OUTPATIENT)
Facility: HOSPITAL | Age: 69
End: 2024-09-27

## 2024-09-27 ENCOUNTER — OFFICE VISIT (OUTPATIENT)
Age: 69
End: 2024-09-27

## 2024-09-27 VITALS — WEIGHT: 230 LBS | HEIGHT: 68 IN | BODY MASS INDEX: 34.86 KG/M2 | TEMPERATURE: 97 F

## 2024-09-27 DIAGNOSIS — M17.11 UNILATERAL PRIMARY OSTEOARTHRITIS, RIGHT KNEE: Primary | ICD-10-CM

## 2024-09-27 RX ORDER — HYALURONATE SODIUM 10 MG/ML
20 SYRINGE (ML) INTRAARTICULAR ONCE
Status: COMPLETED | OUTPATIENT
Start: 2024-09-27 | End: 2024-09-27

## 2024-09-27 RX ADMIN — Medication 20 MG: at 15:57

## 2024-10-01 ENCOUNTER — HOSPITAL ENCOUNTER (OUTPATIENT)
Facility: HOSPITAL | Age: 69
Setting detail: RECURRING SERIES
Discharge: HOME | End: 2024-10-04
Payer: OTHER GOVERNMENT

## 2024-10-01 PROCEDURE — 97140 MANUAL THERAPY 1/> REGIONS: CPT

## 2024-10-01 PROCEDURE — 97110 THERAPEUTIC EXERCISES: CPT

## 2024-10-01 PROCEDURE — 97530 THERAPEUTIC ACTIVITIES: CPT

## 2024-10-01 NOTE — PROGRESS NOTES
PHYSICAL / OCCUPATIONAL THERAPY - DAILY TREATMENT NOTE (updated )    Patient Name: Madi GIVENS Pla, Jr.    Date: 10/1/2024    : 1955  Insurance: Payor: [employer] OPT[employer] / Plan: [employer] OPTUM / Product Type: *No Product type* /      Patient  verified yes     Visit #   Current / Total 13 15   Time   In / Out 7:00 AM 7:50  AM   Pain   In / Out 8 0   Subjective Functional Status/Changes: Pt reports surgeon does not want to do surgery due to his age and co morbidities.  Pt plans to see PCP Dec. 18.     TREATMENT AREA =  Right knee pain [M25.561]    OBJECTIVE    Modalities Rationale:     decrease inflammation and decrease pain to improve patient's ability to progress to PLOF and address remaining functional goals.     min [] Estim Unattended, type/location:                                      []  w/ice    []  w/heat    min [] Estim Attended, type/location:                                     []  w/US     []  w/ice    []  w/heat    []  TENS insruct      min []  Mechanical Traction: type/lbs                   []  pro   []  sup   []  int   []  cont    []  before manual    []  after manual    min []  Ultrasound, settings/location:                                                []  take home patch       []  in clinic   10 min  unbilled [x]  Ice     []  Heat    location/position: Right and left knee; ant/post; supine    min []  Paraffin,  details:     min []  Vasopneumatic Device, press/temp:     min []  Whirlpool / Fluido:    If using vaso (only need to measure limb vaso being performed on)      pre-treatment girth :       post-treatment girth :       measured at (landmark location) :      min []  Other:    Skin assessment post-treatment (if applicable):    [x]  intact    []  redness- no adverse reaction                 []redness - adverse reaction:        Therapeutic Procedures:  Tx Min Billable or 1:1 Min (if diff from Tx Min) Procedure, Rationale, Specifics   15  [id number] Therapeutic Exercise (timed):  increase ROM,

## 2024-10-04 ENCOUNTER — HOSPITAL ENCOUNTER (OUTPATIENT)
Facility: HOSPITAL | Age: 69
Setting detail: RECURRING SERIES
Discharge: HOME | End: 2024-10-07
Payer: OTHER GOVERNMENT

## 2024-10-04 PROCEDURE — 97112 NEUROMUSCULAR REEDUCATION: CPT

## 2024-10-04 PROCEDURE — 97530 THERAPEUTIC ACTIVITIES: CPT

## 2024-10-04 PROCEDURE — 97110 THERAPEUTIC EXERCISES: CPT

## 2024-10-04 PROCEDURE — 97535 SELF CARE MNGMENT TRAINING: CPT

## 2024-10-04 NOTE — PROGRESS NOTES
min = 5 units   Total Total     [x]  Patient Education billed concurrently with other procedures   [x] Review HEP    [] Progressed/Changed HEP, detail:    [] Other detail:       Objective Information/Functional Measures/Assessment    Verbal or Tactile cues required: for sled push/pull for neutral spine and posture   Posture/positioning: good and upright; amb antalgic due to knee pain   ROM: improving slowly     Therex and NMR as per flow sheet.     Patient will continue to benefit from skilled PT / OT services to modify and progress therapeutic interventions, analyze and address functional mobility deficits, analyze and address ROM deficits, analyze and address strength deficits, analyze and address soft tissue restrictions, analyze and cue for proper movement patterns, and analyze and modify for postural abnormalities to address functional deficits and attain remaining goals.    Progress toward goals / Updated goals:  []  See Progress Note/Recertification    Patient tolerated today's treatment well. Progressed/added therex as follows: STS staggered at 22 inches; sled push/pull. Discussed HEP and most recent follow up, as well as requested referral from provider for more PT with patient. Patient is agreeable. Continue to progress as tolerated. Patient making good progress towards all goals at this time. Pain levels remain the same (injection did not improve), ROM and strength slowly improving, pain levels mildly hinder imprvement but patient is eager to maintain and gain more strength and mobility despite pain to improve outcomes. All goals remain applicable.     PLAN  yes Continue plan of care  [x]  Upgrade activities as tolerated  []  Discharge due to :  []  Other:    Next PN/ RC due on or before 10/13/2024  Auth due (visit number/ date) 15 V exp 12/23/24    Angelica Mclaughlin PT    10/4/2024    7:06 AM    If an interpreting service was utilized for treatment of this patient, the contents of this document represent the

## 2024-10-08 ENCOUNTER — HOSPITAL ENCOUNTER (OUTPATIENT)
Facility: HOSPITAL | Age: 69
Setting detail: RECURRING SERIES
Discharge: HOME | End: 2024-10-11
Payer: OTHER GOVERNMENT

## 2024-10-08 PROCEDURE — 97112 NEUROMUSCULAR REEDUCATION: CPT

## 2024-10-08 PROCEDURE — 97535 SELF CARE MNGMENT TRAINING: CPT

## 2024-10-08 PROCEDURE — 97530 THERAPEUTIC ACTIVITIES: CPT

## 2024-10-08 PROCEDURE — 97110 THERAPEUTIC EXERCISES: CPT

## 2024-10-08 NOTE — PROGRESS NOTES
PHYSICAL / OCCUPATIONAL THERAPY - DAILY TREATMENT NOTE (updated )    Patient Name: Madi GIVENS Pla, Jr.    Date: 10/8/2024    : 1955  Insurance: Payor: [employer] OPTUM / Plan: VAC[employer] OPTUM / Product Type: *No Product type* /      Patient  verified yes     Visit #   Current / Total 15 15   Time   In / Out 7:00 am 8:10 am   Pain   In / Out 8 2   Subjective Functional Status/Changes: See PN        TREATMENT AREA =  Right knee pain [M25.561]    OBJECTIVE    Modalities Rationale:     decrease inflammation and decrease pain to improve patient's ability to progress to PLOF and address remaining functional goals.     min [] Estim Unattended, type/location:                                      []  w/ice    []  w/heat    min [] Estim Attended, type/location:                                     []  w/US     []  w/ice    []  w/heat    []  TENS insruct      min []  Mechanical Traction: type/lbs                   []  pro   []  sup   []  int   []  cont    []  before manual    []  after manual    min []  Ultrasound, settings/location:                                                []  take home patch       []  in clinic   10 min  unbilled [x]  Ice     []  Heat    location/position: B knees; longsitting    min []  Paraffin,  details:     min []  Vasopneumatic Device, press/temp:     min []  Whirlpool / Fluido:    If using vaso (only need to measure limb vaso being performed on)      pre-treatment girth :       post-treatment girth :       measured at (landmark location) :      min []  Other:    Skin assessment post-treatment (if applicable):    [x]  intact    []  redness- no adverse reaction                 []redness - adverse reaction:        Therapeutic Procedures:  Tx Min Billable or 1:1 Min (if diff from Tx Min) Procedure, Rationale, Specifics   15  [id number] Therapeutic Exercise (timed):  increase ROM, strength, coordination, balance, and proprioception to improve patient's ability to progress to PLOF and address remaining

## 2024-10-08 NOTE — THERAPY RECERTIFICATION
[facility]SHAWN [facility]  [address] [address] - Ph: [phone]  Fx: [phone]  PHYSICAL THERAPY PROGRESS NOTE  Patient Name: Madi GIVENS Pla, Jr. : 1955   Treatment/Medical Diagnosis: Right knee pain [M25.561]   Referral Source: Kinza Suero PA     Date of Initial Visit: 2024 Attended Visits: 15 Missed Visits: 1 CXL     SUMMARY OF TREATMENT  Pt has made mild progress in PT. Max pain level at 8/10 , average pain level at 6-8/10, least pain level at 5/10. Pt report 30% improvement since beginning therapy. Feels like he can walk a little bit more. Pt notes the knee is about the same. Most recent injection was in September, and provided no relief. Pt expresses desire for surgical intervention since he has tried PT without much relief and multiple trials of injections with the most recent not providing significant relief, however provider he recently saw denied him for surgery due to other comorbidities. Pt expressed frustrations due to being cleared for surgery right before the COVID-19 pandemic occurred and he was unable to proceed due to the shut down of elective surgeries. He also notes he has been performing the HEP as prescribed. Pt will benefit from PT for 8 more weeks addressing his remaining mobility and strength impairments and for a \"prehab\" program pending potential future surgical intervention due to levels of pain being so high.       Next MD Follow Up: 2024 at [facility]     CURRENT STATUS  Patient Perceived Improvement: 30%  Patient reported goals to attain prior to D/C: improve ROM and strength prior to surgical intervention  Functional Deficits: walking, standing, sitting for too long, going to get up after sitting  Functional Improvements: endurance and ROM improving slowly  Pain Levels: Best to worst in last week 5-8/10; Today 810                Medication? Celebrex, Tylenol PRN,

## 2024-10-31 ENCOUNTER — APPOINTMENT (OUTPATIENT)
Facility: HOSPITAL | Age: 69
End: 2024-10-31
Payer: OTHER GOVERNMENT

## 2024-10-31 ENCOUNTER — HOSPITAL ENCOUNTER (OUTPATIENT)
Facility: HOSPITAL | Age: 69
Setting detail: RECURRING SERIES
End: 2024-10-31
Payer: OTHER GOVERNMENT

## 2024-11-01 ENCOUNTER — HOSPITAL ENCOUNTER (OUTPATIENT)
Facility: HOSPITAL | Age: 69
Setting detail: RECURRING SERIES
Discharge: HOME | End: 2024-11-04
Payer: OTHER GOVERNMENT

## 2024-11-01 PROCEDURE — 97530 THERAPEUTIC ACTIVITIES: CPT

## 2024-11-01 PROCEDURE — 97112 NEUROMUSCULAR REEDUCATION: CPT

## 2024-11-01 PROCEDURE — 97140 MANUAL THERAPY 1/> REGIONS: CPT

## 2024-11-01 PROCEDURE — 97110 THERAPEUTIC EXERCISES: CPT

## 2024-11-01 NOTE — PROGRESS NOTES
Education billed concurrently with other procedures   [x] Review HEP    [] Progressed/Changed HEP, detail:    [] Other detail:       Objective Information/Functional Measures/Assessment    Pt has a plan going forward to possibly get approval for a TKA through his new PCP  Plan to cont PT as we were approved 15 more visits.   Taped the knee today which did provide some pain relief with dependant activity. Did well with [facility] exercises today without pain but has some cramping in the R HS with HS curl today. Ended with [facility] for HS due to cramping and pt left feeling better.     Patient will continue to benefit from skilled PT / OT services to modify and progress therapeutic interventions, analyze and address functional mobility deficits, analyze and address ROM deficits, analyze and address strength deficits, analyze and address soft tissue restrictions, analyze and cue for proper movement patterns, and analyze and modify for postural abnormalities to address functional deficits and attain remaining goals.    Progress toward goals / Updated goals:  []  See Progress Note/Recertification    Will reassess response to taping NV to determine of appropriate for future use for pain relief.     PLAN  yes Continue plan of care  [x]  Upgrade activities as tolerated  []  Discharge due to :  []  Other:    Next PN/ RC due on or before 11/8/2024  Auth due (visit number/ date) 15 V (30 V total) 2/20/24    Abran Cody PT    11/1/2024    12:30 PM    If an interpreting service was utilized for treatment of this patient, the contents of this document represent the material reviewed with the patient via the .     Future Appointments   Date Time Provider Department Center   11/5/2024 10:20 AM Abran Cody PT [facility]   11/7/2024  7:40 AM Abran Cody PT [facility]   11/13/2024 11:40 AM Abran Cody PT [facility]   11/14/2024  7:00 AM Angelica Mclaughlin PT [facility]

## 2024-11-05 ENCOUNTER — HOSPITAL ENCOUNTER (OUTPATIENT)
Facility: HOSPITAL | Age: 69
Setting detail: RECURRING SERIES
Discharge: HOME | End: 2024-11-08
Payer: OTHER GOVERNMENT

## 2024-11-05 PROCEDURE — 97112 NEUROMUSCULAR REEDUCATION: CPT

## 2024-11-05 PROCEDURE — 97530 THERAPEUTIC ACTIVITIES: CPT

## 2024-11-05 PROCEDURE — 97110 THERAPEUTIC EXERCISES: CPT

## 2024-11-05 NOTE — PROGRESS NOTES
PHYSICAL / OCCUPATIONAL THERAPY - DAILY TREATMENT NOTE (updated )    Patient Name: Madi GIVENS Pla, Jr.    Date: 2024    : 1955  Insurance: Payor: [employer] OPTUM / Plan: [employer] OPTUM / Product Type: *No Product type* /      Patient  verified yes     Visit #   Current / Total 17 30   Time   In / Out 10:22 11:05   Pain   In / Out 5/10  0/10   Subjective Functional Status/Changes: Took the tape off the knee this morning. Faxed the xray results to the clinic.      TREATMENT AREA =  Right knee pain [M25.561]    OBJECTIVE    Modalities Rationale:     decrease inflammation and decrease pain to improve patient's ability to progress to PLOF and address remaining functional goals.     min [] Estim Unattended, type/location:                                      []  w/ice    []  w/heat    min [] Estim Attended, type/location:                                     []  w/US     []  w/ice    []  w/heat    []  TENS insruct      min []  Mechanical Traction: type/lbs                   []  pro   []  sup   []  int   []  cont    []  before manual    []  after manual    min []  Ultrasound, settings/location:                                                []  take home patch       []  in clinic   10 min  unbilled [x]  Ice     []  Heat    location/position: Supine B LE over wedge: R knee    min []  Paraffin,  details:     min []  Vasopneumatic Device, press/temp:     min []  Whirlpool / Fluido:    If using vaso (only need to measure limb vaso being performed on)      pre-treatment girth :       post-treatment girth :       measured at (landmark location) :      min []  Other:    Skin assessment post-treatment (if applicable):    [x]  intact    []  redness- no adverse reaction                 []redness - adverse reaction:        Therapeutic Procedures:  Tx Min Billable or 1:1 Min (if diff from Tx Min) Procedure, Rationale, Specifics   13  63347 Therapeutic Exercise (timed):  increase ROM, strength, coordination, balance, and

## 2024-11-07 ENCOUNTER — HOSPITAL ENCOUNTER (OUTPATIENT)
Facility: HOSPITAL | Age: 69
Setting detail: RECURRING SERIES
Discharge: HOME | End: 2024-11-10
Payer: OTHER GOVERNMENT

## 2024-11-07 PROCEDURE — 97140 MANUAL THERAPY 1/> REGIONS: CPT

## 2024-11-07 PROCEDURE — 97530 THERAPEUTIC ACTIVITIES: CPT

## 2024-11-07 PROCEDURE — 97110 THERAPEUTIC EXERCISES: CPT

## 2024-11-07 PROCEDURE — 97112 NEUROMUSCULAR REEDUCATION: CPT

## 2024-11-07 NOTE — PROGRESS NOTES
PHYSICAL / OCCUPATIONAL THERAPY - DAILY TREATMENT NOTE (updated )    Patient Name: Madi GIVENS Pla, Jr.    Date: 2024    : 1955  Insurance: Payor: [employer] OPTUM / Plan: [employer] OPTUM / Product Type: *No Product type* /      Patient  verified yes     Visit #   Current / Total 18 30   Time   In / Out 7:30 8:33   Pain   In / Out 5/10 2/10   Subjective Functional Status/Changes: See PN     TREATMENT AREA =  Right knee pain [M25.561]    OBJECTIVE    Modalities Rationale:     decrease inflammation and decrease pain to improve patient's ability to progress to PLOF and address remaining functional goals.     min [] Estim Unattended, type/location:                                      []  w/ice    []  w/heat    min [] Estim Attended, type/location:                                     []  w/US     []  w/ice    []  w/heat    []  TENS insruct      min []  Mechanical Traction: type/lbs                   []  pro   []  sup   []  int   []  cont    []  before manual    []  after manual    min []  Ultrasound, settings/location:                                                []  take home patch       []  in clinic   10 min  unbilled [x]  Ice     []  Heat    location/position: Supine B LE over wedge: R knee    min []  Paraffin,  details:     min []  Vasopneumatic Device, press/temp:     min []  Whirlpool / Fluido:    If using vaso (only need to measure limb vaso being performed on)      pre-treatment girth :       post-treatment girth :       measured at (landmark location) :      min []  Other:    Skin assessment post-treatment (if applicable):    [x]  intact    []  redness- no adverse reaction                 []redness - adverse reaction:        Therapeutic Procedures:  Tx Min Billable or 1:1 Min (if diff from Tx Min) Procedure, Rationale, Specifics   25  06874 Therapeutic Exercise (timed):  increase ROM, strength, coordination, balance, and proprioception to improve patient's ability to progress to PLOF and address

## 2024-11-07 NOTE — THERAPY RECERTIFICATION
[facility]  [address],[place] [address] - Ph: [phone]  Fx: [phone]  PHYSICAL THERAPY PROGRESS NOTE  Patient Name: Madi GIVENS Pla, Jr. : 1955   Treatment/Medical Diagnosis: Right knee pain [M25.561]   Referral Source: Kinza Suero PA     Date of Initial Visit:  Attended Visits: 18 Missed Visits: 1 NS  1 CXL     SUMMARY OF TREATMENT  Pt has been evaluated/assessed and participated in 18 PT sessions involving skilled therapeutic exercise, functional activity training,?neuromuscular facilitation and coordination training, patient education,?manual therapy interventions, modalities including ice, and instruction on HEP in order to improve upon R knee ROM, strength, decreased pain, and return to PLOF.    SUBJECTIVE: been working all night so the knee is feeling it. Already has over 8,000 steps today.    CURRENT STATUS  Pt has only been seen 3x since his last PN due to gap in receiving authorization for PT visits.   Pt has made minimal progress in PT. Max pain level still at 8/10, average pain level at 5/10, least pain level at 5/10. Pt report still being about 30% improvement since beginning therapy which is no improvement since last PN done on 10/8/24. Pt notes the knee is about the same. Most recent viscosupplementation injection was in September, and provided no long term relief. No other injections for the R knee since this. Currently reports having changed PCPs to Dr. Andrew but still under the same practice with the same PA. Had repeat Xray's on 10/25/24. [facility] and [facility] to have an MRI which is not scheduled yet. Will be going back to see his provider on 24.   Pt expresses desire for surgical intervention since he has tried PT since 24 without much relief and multiple trials of injections with the most recent not providing significant relief, however

## 2024-11-13 ENCOUNTER — HOSPITAL ENCOUNTER (OUTPATIENT)
Facility: HOSPITAL | Age: 69
Setting detail: RECURRING SERIES
Discharge: HOME | End: 2024-11-16
Payer: OTHER GOVERNMENT

## 2024-11-13 PROCEDURE — 97110 THERAPEUTIC EXERCISES: CPT

## 2024-11-13 PROCEDURE — 97112 NEUROMUSCULAR REEDUCATION: CPT

## 2024-11-13 PROCEDURE — 97140 MANUAL THERAPY 1/> REGIONS: CPT

## 2024-11-13 NOTE — PROGRESS NOTES
Measures/Assessment    Pt being see for back to back days of PT. Therefore today's session was spent more address soft tissue mobility, flexibility, and balance training. Incr TTP along medial joint line with hypomotility with tibial IR. Pt shows incr ankle strategy with SLS with heavier eversion moments to compensate. Pt also shown various alterations to challenge SLS today.     Will proceed with strength training during tomorrow's session.     Patient will continue to benefit from skilled PT / OT services to modify and progress therapeutic interventions, analyze and address functional mobility deficits, analyze and address ROM deficits, analyze and address strength deficits, analyze and address soft tissue restrictions, analyze and cue for proper movement patterns, and analyze and modify for postural abnormalities to address functional deficits and attain remaining goals.    Progress toward goals / Updated goals:  []  See Progress Note/Recertification    Made some progress in terms of SLS stability today, showing various challenges for progressing balance.     PLAN  yes Continue plan of care  [x]  Upgrade activities as tolerated  []  Discharge due to :  []  Other:    Next PN/ RC due on or before 12/7/24  Auth due (visit number/ date) 15 V (30 V total) 2/20/24    Abran Cody, PT    11/13/2024    9:13 AM    If an interpreting service was utilized for treatment of this patient, the contents of this document represent the material reviewed with the patient via the .     Future Appointments   Date Time Provider Department Center   11/13/2024 11:40 AM Abran Cody, PT [facility]   11/14/2024  7:00 AM Angelica Mclaughlin, PT [facility]   11/19/2024 10:20 AM Abran Cody, PT [facility]   11/22/2024  7:40 AM Carlos García, PT [facility]   11/26/2024  7:40 AM Daniel Alonso, PT [facility]   11/27/2024 10:20 AM Abran Cody, PT [facility]   12/3/2024  7:00 AM Angelica Mclaughlin, PT [facility]   12/6/2024

## 2024-11-14 ENCOUNTER — HOSPITAL ENCOUNTER (OUTPATIENT)
Facility: HOSPITAL | Age: 69
Setting detail: RECURRING SERIES
Discharge: HOME | End: 2024-11-17
Payer: OTHER GOVERNMENT

## 2024-11-14 PROCEDURE — 97530 THERAPEUTIC ACTIVITIES: CPT

## 2024-11-14 PROCEDURE — 97112 NEUROMUSCULAR REEDUCATION: CPT

## 2024-11-14 PROCEDURE — 97110 THERAPEUTIC EXERCISES: CPT

## 2024-11-14 NOTE — PROGRESS NOTES
PHYSICAL / OCCUPATIONAL THERAPY - DAILY TREATMENT NOTE (updated )    Patient Name: Madi GIVENS Pla, Jr.    Date: 2024    : 1955  Insurance: Payor: [employer] OPTUM / Plan: [employer] OPTUM / Product Type: *No Product type* /      Patient  verified yes     Visit #   Current / Total 20 30   Time   In / Out 700 am 750 am   Pain   In / Out 6 5   Subjective Functional Status/Changes: Patient brings in his xray reports from his most recent study done in 2024. Pain remains the same.         TREATMENT AREA =  Right knee pain [M25.561]    OBJECTIVE    Modalities Rationale:     decrease inflammation and decrease pain to improve patient's ability to progress to PLOF and address remaining functional goals.     min [] Estim Unattended, type/location:                                      []  w/ice    []  w/heat    min [] Estim Attended, type/location:                                     []  w/US     []  w/ice    []  w/heat    []  TENS insruct      min []  Mechanical Traction: type/lbs                   []  pro   []  sup   []  int   []  cont    []  before manual    []  after manual    min []  Ultrasound, settings/location:                                                []  take home patch       []  in clinic   10 min  unbilled [x]  Ice     []  Heat    location/position: Rigth knee; ant/post; longsitting    min []  Paraffin,  details:     min []  Vasopneumatic Device, press/temp:     min []  Whirlpool / Fluido:    If using vaso (only need to measure limb vaso being performed on)      pre-treatment girth :       post-treatment girth :       measured at (landmark location) :      min []  Other:    Skin assessment post-treatment (if applicable):    [x]  intact    []  redness- no adverse reaction                 []redness - adverse reaction:        Therapeutic Procedures:  Tx Min Billable or 1:1 Min (if diff from Tx Min) Procedure, Rationale, Specifics   15  69657 Therapeutic Exercise (timed):  increase ROM, strength,

## 2024-11-19 ENCOUNTER — HOSPITAL ENCOUNTER (OUTPATIENT)
Facility: HOSPITAL | Age: 69
Setting detail: RECURRING SERIES
Discharge: HOME | End: 2024-11-22
Payer: OTHER GOVERNMENT

## 2024-11-19 PROCEDURE — 97530 THERAPEUTIC ACTIVITIES: CPT

## 2024-11-19 PROCEDURE — 97110 THERAPEUTIC EXERCISES: CPT

## 2024-11-19 PROCEDURE — 97112 NEUROMUSCULAR REEDUCATION: CPT

## 2024-11-19 PROCEDURE — 97140 MANUAL THERAPY 1/> REGIONS: CPT

## 2024-11-19 NOTE — PROGRESS NOTES
PHYSICAL / OCCUPATIONAL THERAPY - DAILY TREATMENT NOTE (updated )    Patient Name: Madi GIVENS Pla, Jr.    Date: 2024    : 1955  Insurance: Payor: [employer] OPTUM / Plan: [employer] OPTUM / Product Type: *No Product type* /      Patient  verified yes     Visit #   Current / Total 21 30   Time   In / Out 10:10 10:51   Pain   In / Out 3/10 4/10   Subjective Functional Status/Changes: The knees are feeling it. Both of them but the left is just a bother.      TREATMENT AREA =  Right knee pain [M25.561]    OBJECTIVE    Modalities Rationale:     decrease inflammation and decrease pain to improve patient's ability to progress to PLOF and address remaining functional goals.     min [] Estim Unattended, type/location:                                      []  w/ice    []  w/heat    min [] Estim Attended, type/location:                                     []  w/US     []  w/ice    []  w/heat    []  TENS insruct      min []  Mechanical Traction: type/lbs                   []  pro   []  sup   []  int   []  cont    []  before manual    []  after manual    min []  Ultrasound, settings/location:                                                []  take home patch       []  in clinic   ND min  unbilled [x]  Ice     []  Heat    location/position: Rigth knee; ant/post; longsitting    min []  Paraffin,  details:     min []  Vasopneumatic Device, press/temp:     min []  Whirlpool / Fluido:    If using vaso (only need to measure limb vaso being performed on)      pre-treatment girth :       post-treatment girth :       measured at (landmark location) :      min []  Other:    Skin assessment post-treatment (if applicable):    [x]  intact    []  redness- no adverse reaction                 []redness - adverse reaction:        Therapeutic Procedures:  Tx Min Billable or 1:1 Min (if diff from Tx Min) Procedure, Rationale, Specifics   13  76365 Therapeutic Exercise (timed):  increase ROM, strength, coordination, balance, and

## 2024-11-22 ENCOUNTER — HOSPITAL ENCOUNTER (OUTPATIENT)
Facility: HOSPITAL | Age: 69
Setting detail: RECURRING SERIES
Discharge: HOME | End: 2024-11-25
Payer: OTHER GOVERNMENT

## 2024-11-22 PROCEDURE — 97112 NEUROMUSCULAR REEDUCATION: CPT

## 2024-11-22 PROCEDURE — 97530 THERAPEUTIC ACTIVITIES: CPT

## 2024-11-22 PROCEDURE — 97110 THERAPEUTIC EXERCISES: CPT

## 2024-11-22 PROCEDURE — 97535 SELF CARE MNGMENT TRAINING: CPT

## 2024-11-22 NOTE — PROGRESS NOTES
PHYSICAL / OCCUPATIONAL THERAPY - DAILY TREATMENT NOTE (updated )    Patient Name: Madi GIVENS Pla, Jr.    Date: 2024    : 1955  Insurance: Payor: [employer] OPTUM / Plan: [employer] OPTUM / Product Type: *No Product type* /      Patient  verified yes     Visit #   Current / Total 22 30   Time   In / Out 7:40 8:23   Pain   In / Out 3/10 3/10   Subjective Functional Status/Changes: Everything feels ok.          TREATMENT AREA =  Right knee pain [M25.561]    OBJECTIVE    Modalities Rationale:     decrease inflammation and decrease pain to improve patient's ability to progress to PLOF and address remaining functional goals.     min [] Estim Unattended, type/location:                                      []  w/ice    []  w/heat    min [] Estim Attended, type/location:                                     []  w/US     []  w/ice    []  w/heat    []  TENS insruct      min []  Mechanical Traction: type/lbs                   []  pro   []  sup   []  int   []  cont    []  before manual    []  after manual    min []  Ultrasound, settings/location:                                                []  take home patch       []  in clinic   ND min  unbilled [x]  Ice     []  Heat    location/position: Rigth knee; ant/post; longsitting    min []  Paraffin,  details:     min []  Vasopneumatic Device, press/temp:     min []  Whirlpool / Fluido:    If using vaso (only need to measure limb vaso being performed on)      pre-treatment girth :       post-treatment girth :       measured at (landmark location) :      min []  Other:    Skin assessment post-treatment (if applicable):    [x]  intact    []  redness- no adverse reaction                 []redness - adverse reaction:        Therapeutic Procedures:  Tx Min Billable or 1:1 Min (if diff from Tx Min) Procedure, Rationale, Specifics   15  12257 Therapeutic Exercise (timed):  increase ROM, strength, coordination, balance, and proprioception to improve patient's ability to progress to

## 2024-11-26 ENCOUNTER — HOSPITAL ENCOUNTER (OUTPATIENT)
Facility: HOSPITAL | Age: 69
Setting detail: RECURRING SERIES
Discharge: HOME | End: 2024-11-29
Payer: OTHER GOVERNMENT

## 2024-11-26 PROCEDURE — 97530 THERAPEUTIC ACTIVITIES: CPT

## 2024-11-26 PROCEDURE — 97110 THERAPEUTIC EXERCISES: CPT

## 2024-11-26 NOTE — PROGRESS NOTES
detail:    [] Other detail:       Objective Information/Functional Measures/Assessment  Continued exercises per flow sheet with good effort. Pt very tired today due to working overnight shift, but able to complete all drills.    Patient will continue to benefit from skilled PT / OT services to modify and progress therapeutic interventions, analyze and address functional mobility deficits, analyze and address ROM deficits, analyze and address strength deficits, analyze and address soft tissue restrictions, analyze and cue for proper movement patterns, and analyze and modify for postural abnormalities to address functional deficits and attain remaining goals.    Progress toward goals / Updated goals:  []  See Progress Note/Recertification  Progressing strength to improve post op recovery 11/26/24    PLAN  yes Continue plan of care  [x]  Upgrade activities as tolerated  []  Discharge due to :  []  Other:    Next PN/ RC due on or before 12/7/2024  Auth due (visit number/ date) 30 V exp 2/20/2024    Daniel Alonso PT, DPT    11/26/2024    7:34 AM    If an interpreting service was utilized for treatment of this patient, the contents of this document represent the material reviewed with the patient via the .     Future Appointments   Date Time Provider Department Center   11/26/2024  7:40 AM Daniel Alonso PT [facility]   11/27/2024 10:20 AM Abran Cody, WI[facility]   12/3/2024  7:00 AM Angelica Mclaughlin, PT [facility]   12/6/2024  7:00 AM Angelica Mclaughlin, PT [facility]

## 2024-11-27 ENCOUNTER — HOSPITAL ENCOUNTER (OUTPATIENT)
Facility: HOSPITAL | Age: 69
Setting detail: RECURRING SERIES
Discharge: HOME | End: 2024-11-30
Payer: OTHER GOVERNMENT

## 2024-11-27 PROCEDURE — 97140 MANUAL THERAPY 1/> REGIONS: CPT

## 2024-11-27 PROCEDURE — 97110 THERAPEUTIC EXERCISES: CPT

## 2024-11-27 PROCEDURE — 97530 THERAPEUTIC ACTIVITIES: CPT

## 2024-11-27 NOTE — PROGRESS NOTES
PHYSICAL / OCCUPATIONAL THERAPY - DAILY TREATMENT NOTE (updated )    Patient Name: Madi GIVENS Pla, Jr.    Date: 2024    : 1955  Insurance: Payor: V[employer] OPTUM / Plan: VACCN OPTUM / Product Type: *No Product type* /      Patient  verified yes     Visit #   Current / Total 24 30   Time   In / Out 1:00 1:40   Pain   In / Out 7/10 4/10   Subjective Functional Status/Changes: Says worked last night, got home at 6am and it was bothered.   He will have MRI on Dec 13th.  He has to re-schedule his doctor apt from  to 2025 bc his wife is having surgery.    Worse thing for it is being on the knee, like at work.      TREATMENT AREA =  Right knee pain [M25.561]    OBJECTIVE  Modalities Rationale:     decrease inflammation and decrease pain to improve patient's ability to progress to PLOF and address remaining functional goals.   min [] Estim Unattended, type/location:                                      []  w/ice    []  w/heat    min [] Estim Attended, type/location:                                     []  w/US     []  w/ice    []  w/heat    []  TENS insruct      min []  Mechanical Traction: type/lbs                   []  pro   []  sup   []  int   []  cont    []  before manual    []  after manual    min []  Ultrasound, settings/location:                                                []  take home patch       []  in clinic   ND min  unbilled [x]  Ice     []  Heat    location/position: Rigth knee; ant/post; longsitting    min []  Paraffin,  details:     min []  Vasopneumatic Device, press/temp:     min []  Whirlpool / Fluido:    If using vaso (only need to measure limb vaso being performed on)      pre-treatment girth :       post-treatment girth :       measured at (landmark location) :      min []  Other:    Skin assessment post-treatment (if applicable):    [x]  intact    []  redness- no adverse reaction                 []redness - adverse reaction:        Therapeutic Procedures:  Tx Min

## 2024-12-03 ENCOUNTER — HOSPITAL ENCOUNTER (OUTPATIENT)
Facility: HOSPITAL | Age: 69
Setting detail: RECURRING SERIES
Discharge: HOME | End: 2024-12-06
Payer: OTHER GOVERNMENT

## 2024-12-03 PROCEDURE — 97112 NEUROMUSCULAR REEDUCATION: CPT

## 2024-12-03 PROCEDURE — 97110 THERAPEUTIC EXERCISES: CPT

## 2024-12-03 PROCEDURE — 97530 THERAPEUTIC ACTIVITIES: CPT

## 2024-12-03 NOTE — PROGRESS NOTES
PHYSICAL / OCCUPATIONAL THERAPY - DAILY TREATMENT NOTE (updated )    Patient Name: Madi GIVENS Pla, Jr.    Date: 12/3/2024    : 1955  Insurance: Payor: V[employer] OPTUM / Plan: VACCN OPTUM / Product Type: *No Product type* /      Patient  verified yes     Visit #   Current / Total 25 30   Time   In / Out 700 am 740 am   Pain   In / Out 7 4   Subjective Functional Status/Changes: Patient reports no changes since last week. Notes his knees remain very stiff in the mronings.         TREATMENT AREA =  Right knee pain [M25.561]    OBJECTIVE    Therapeutic Procedures:  Tx Min Billable or 1:1 Min (if diff from Tx Min) Procedure, Rationale, Specifics   15  24865 Therapeutic Exercise (timed):  increase ROM, strength, coordination, balance, and proprioception to improve patient's ability to progress to PLOF and address remaining functional goals. (see flow sheet as applicable)     Details if applicable:       15  52730 Neuromuscular Re-Education (timed):  improve balance, coordination, kinesthetic sense, posture, core stability and proprioception to improve patient's ability to develop conscious control of individual muscles and awareness of position of extremities in order to progress to PLOF and address remaining functional goals. (see flow sheet as applicable)     Details if applicable:     10  99392 Therapeutic Activity (timed):  use of dynamic activities replicating functional movements to increase ROM, strength, coordination, balance, and proprioception in order to improve patient's ability to progress to PLOF and address remaining functional goals.  (see flow sheet as applicable)     Details if applicable:     40  [facility] Totals Reminder: bill using total billable min of TIMED therapeutic procedures (example: do not include dry needle or estim unattended, both untimed codes, in totals to left)  8-22 min = 1 unit; 23-37 min = 2 units; 38-52 min = 3 units; 53-67 min = 4 units; 68-82 min = 5 units   Total Total     [x]

## 2024-12-06 ENCOUNTER — HOSPITAL ENCOUNTER (OUTPATIENT)
Facility: HOSPITAL | Age: 69
Setting detail: RECURRING SERIES
Discharge: HOME | End: 2024-12-09
Payer: OTHER GOVERNMENT

## 2024-12-06 PROCEDURE — 97530 THERAPEUTIC ACTIVITIES: CPT

## 2024-12-06 PROCEDURE — 97535 SELF CARE MNGMENT TRAINING: CPT

## 2024-12-06 PROCEDURE — 97112 NEUROMUSCULAR REEDUCATION: CPT

## 2024-12-06 NOTE — THERAPY RECERTIFICATION
[facility]  [address],[place] [address] - Ph: [phone]  Fx: [phone]  PHYSICAL THERAPY PROGRESS NOTE  Patient Name: Madi GIVENS Pla, Jr. : 1955   Treatment/Medical Diagnosis: Right knee pain [M25.561]   Referral Source: Kinza Suero PA     Date of Initial Visit: 2024 Attended Visits: 26 Missed Visits: 2 NS     SUMMARY OF TREATMENT  Patient has attended IE and 24 follow up visits to address Right knee pain [M25.561]. Patient has been provided therapeutic exercise, therapeutic activity, NMR, manual therapies, self-care instruction for pain relief, provided an HEP and patient education regarding diagnosis and prognosis. Patient has also been provided modalities to assist with pain relief post-sessions. Patient responding fairly to PT intervention at this time.     Next MD Follow Up: [place] 3/17/2025; [facility]. MRI scheduled on 2024 for R knee.    CURRENT STATUS  Patient Perceived Improvement: 50% (maxed therapeutic benefit due to arthritis)  Functional Deficits: Pain remains the same and is worsening  Functional Improvements: Strength has improved overall  Pain Levels: Best to worst in last week 4-10/10; Today 7/10    Medication? Taking celecoxib for other pains, percocet PRN, Toradol PRN   Aggravating Factors: working 12 hours   Easing Factors: elevating LEs   Overall Improvement?  [x] Better [] Worse [] Unchanged  Sleeping:   Quality? [] Unaffected [x] Interrupted [x] Shortened/Not enough  Total of 4 hours/night; 1 hour intervals  Positional Tolerances:   Sittin hours with legs elevated; 45 minutes to 1 hour in standard chair   Standin hours   Walkin hours   Laying Supine: unaffected   Laying Prone: does not perform   Side-lyin hour   Sitting at Computer: 2 hours   Readin hours   Drivin.5 hours   Squatting: Very dificult   Kneeling: very

## 2024-12-06 NOTE — PROGRESS NOTES
[facility]   12/20/2024  7:40 AM Tavo Sands, PT [facility]   12/24/2024  7:00 AM Angelica Mclaughlin, PT [facility]

## 2024-12-11 ENCOUNTER — HOSPITAL ENCOUNTER (OUTPATIENT)
Facility: HOSPITAL | Age: 69
Setting detail: RECURRING SERIES
End: 2024-12-11
Payer: OTHER GOVERNMENT

## 2024-12-11 ENCOUNTER — HOSPITAL ENCOUNTER (OUTPATIENT)
Facility: HOSPITAL | Age: 69
Setting detail: RECURRING SERIES
Discharge: HOME | End: 2024-12-14
Payer: OTHER GOVERNMENT

## 2024-12-11 PROCEDURE — 97530 THERAPEUTIC ACTIVITIES: CPT

## 2024-12-11 PROCEDURE — 97112 NEUROMUSCULAR REEDUCATION: CPT

## 2024-12-11 PROCEDURE — 97110 THERAPEUTIC EXERCISES: CPT

## 2024-12-11 NOTE — PROGRESS NOTES
PHYSICAL / OCCUPATIONAL THERAPY - DAILY TREATMENT NOTE (updated )    Patient Name: Madi GIVENS Pla, Jr.    Date: 2024    : 1955  Insurance: Payor: V[employer]CN OPTUM / Plan: VACCN OPTUM / Product Type: *No Product type* /      Patient  verified yes     Visit #   Current / Total 27 30   Time   In / Out 11:00 11:45   Pain   In / Out 8 7   Subjective Functional Status/Changes: Achy today, maybe the weather. Notices his limping. Says he has aortic ischemia and kidney stone so he miay not have surgery soon. He needs to get that cleared.     TREATMENT AREA =  Right knee pain [M25.561]    OBJECTIVE    Therapeutic Procedures:  Tx Min Billable or 1:1 Min (if diff from Tx Min) Procedure, Rationale, Specifics   8  18361 Neuromuscular Re-Education (timed):  improve balance, coordination, kinesthetic sense, posture, core stability and proprioception to improve patient's ability to develop conscious control of individual muscles and awareness of position of extremities in order to progress to PLOF and address remaining functional goals. (see flow sheet as applicable)     Details if applicable:     24  30642 Therapeutic Activity (timed):  use of dynamic activities replicating functional movements to increase ROM, strength, coordination, balance, and proprioception in order to improve patient's ability to progress to PLOF and address remaining functional goals.  (see flow sheet as applicable)     Details if applicable:     13  09289 Therapeutic Exercise (timed):  increase ROM, strength, coordination, balance, and proprioception to improve patient's ability to progress to PLOF and address remaining functional goals. (see flow sheet as applicable)       Details if applicable:     45  [facility] Totals Reminder: bill using total billable min of TIMED therapeutic procedures (example: do not include dry needle or estim unattended, both untimed codes, in totals to left)  8-22 min = 1 unit; 23-37 min = 2 units; 38-52 min = 3 units;

## 2024-12-16 ENCOUNTER — HOSPITAL ENCOUNTER (OUTPATIENT)
Facility: HOSPITAL | Age: 69
Setting detail: RECURRING SERIES
Discharge: HOME | End: 2024-12-19
Payer: OTHER GOVERNMENT

## 2024-12-16 PROCEDURE — 97110 THERAPEUTIC EXERCISES: CPT

## 2024-12-16 PROCEDURE — 97530 THERAPEUTIC ACTIVITIES: CPT

## 2024-12-16 PROCEDURE — 97535 SELF CARE MNGMENT TRAINING: CPT

## 2024-12-16 NOTE — THERAPY DISCHARGE
[facility]  [address], [address],[place] [address] - Ph: [phone]  Fx: [phone]  PHYSICAL THERAPY DISCHARGE NOTE  Patient Name: Madi GIVENS Pla, Jr. : 1955   Treatment/Medical Diagnosis: Right knee pain [M25.561]   Referral Source: Kinza Suero PA     Date of Initial Visit: 2024 Attended Visits: 28 Missed Visits: 2 NS     SUMMARY OF TREATMENT  Patient has attended IE and 27 follow up visits to address Right knee pain [M25.561]. Patient has been provided therapeutic exercise, therapeutic activity, NMR, manual therapies, self-care instruction for pain relief, provided an HEP and patient education regarding diagnosis and prognosis. Patient has also been provided modalities to assist with pain relief post-sessions. Patient responding fairly to PT intervention at this time.     CURRENT STATUS  Pt has made fair progress with skilled PT, however has met a plateau in progress at this point. Reports max pain levels at 10/10 and average pain levels at 5-6/10. Notes improvement with strengthening around the knee joint, but says he hasn't seen much improvement with functional activities such as negotiating stairs, ambulation, transfers, and getting on and off the floor due to it still being painful. Pt was given progressive HEP and discussed going to the gym for further LE strengthening to assist with arthritic pains. He recently had an MRI and I advised him to follow up with ortho about this for next steps in terms of pain management vs surgical options. Pt will be DC at this time due to maximizing progress with PT and no longer requiring skilled PT for strengthening.    Goals/Measure of Progress - STG Goal Met?   1.  Patient will reduce max pain to 7/10 in order to perform iADLs and ADLs more comfortably.    Status at last Eval: 10/10 Current Status: 10/10 Unchanged             Goal/Measure of Progress - LTGs Goal Met?   1.  Patient will

## 2024-12-16 NOTE — PROGRESS NOTES
[facility]  [facility]  [address] [address]   Ph: [phone] Fx: [phone]    DISCHARGE INSTRUCTIONS    Patient: Madi GIVENS Pla, Jr.  : 1955  Date: 2024    Reason for discharge from physical therapy:    [] Met or Progressing to all set goals    [] Minimal Progress made to set goals    [x] Plateau in improvement    [] Insurance/financial factors    [] Other: ______________________    Instructions for home    Continue Home Exercise Program 4-5 times per week as indicated on your handout      Continue with    [x] Ice    [] Heat   as needed for 10-15 minutes                                                             Follow up with MD:      [] Upon completion of therapy                           [x] As needed     If you have severe/emergency pain call  or seek immediate medical attention      Information Regarding Direct Access to physical therapy  [place] law now allows for a patient to see a physical therapist directly (without having first seen a physician) for an evaluation and treatment. Certain rules apply and not all insurances participate. If you are interested in seeing a physical therapist directly for your injury/condition please contact us or visit [email or web address]      We value your opinion! Please leave us a Google review by searching \"[facility]\" then select reviews.      Daniel Alonso, WILEY 2024 8:48 AM    
    [x]  Patient Education billed concurrently with other procedures   [x] Review HEP    [] Progressed/Changed HEP, detail:  Access Code: [email or web address]  [] Other detail:       Objective Information/Functional Measures/Assessment    See DC    Patient will continue to benefit from skilled PT / OT services to modify and progress therapeutic interventions, analyze and address functional mobility deficits, analyze and address ROM deficits, analyze and address strength deficits, analyze and address soft tissue restrictions, analyze and cue for proper movement patterns, analyze and modify for postural abnormalities, and analyze and address imbalance/dizziness to address functional deficits and attain remaining goals.    Progress toward goals / Updated goals:  [x]  See DC    See DC    PLAN  no Continue plan of care  []  Upgrade activities as tolerated  [x]  Discharge due to : plateau in progress  []  Other:    Next PN/ RC due on or before 1/6/2025  Auth due (visit number/ date) 30 V exp 2/20/2025    Daniel Alonso PT    12/16/2024    7:39 AM    If an interpreting service was utilized for treatment of this patient, the contents of this document represent the material reviewed with the patient via the .     Future Appointments   Date Time Provider Department Center   12/16/2024  8:20 AM Daniel Alonso, PT [facility]   12/20/2024  7:40 AM Tavo Sands, PT [facility]   12/24/2024  7:00 AM Angelica Mclaughlin, PT [facility]

## 2024-12-20 ENCOUNTER — APPOINTMENT (OUTPATIENT)
Facility: HOSPITAL | Age: 69
End: 2024-12-20
Payer: OTHER GOVERNMENT

## 2024-12-24 ENCOUNTER — HOSPITAL ENCOUNTER (OUTPATIENT)
Facility: HOSPITAL | Age: 69
Setting detail: RECURRING SERIES
End: 2024-12-24
Payer: OTHER GOVERNMENT